# Patient Record
Sex: MALE | Race: WHITE | NOT HISPANIC OR LATINO | Employment: FULL TIME | URBAN - METROPOLITAN AREA
[De-identification: names, ages, dates, MRNs, and addresses within clinical notes are randomized per-mention and may not be internally consistent; named-entity substitution may affect disease eponyms.]

---

## 2023-10-24 ENCOUNTER — OFFICE VISIT (OUTPATIENT)
Dept: PRIMARY CARE | Facility: CLINIC | Age: 53
End: 2023-10-24
Payer: COMMERCIAL

## 2023-10-24 VITALS — WEIGHT: 227 LBS | SYSTOLIC BLOOD PRESSURE: 118 MMHG | DIASTOLIC BLOOD PRESSURE: 68 MMHG

## 2023-10-24 DIAGNOSIS — R63.5 WEIGHT GAIN: ICD-10-CM

## 2023-10-24 DIAGNOSIS — R10.31 INGUINAL PAIN, RIGHT: Primary | ICD-10-CM

## 2023-10-24 PROCEDURE — 99203 OFFICE O/P NEW LOW 30 MIN: CPT | Performed by: INTERNAL MEDICINE

## 2023-10-24 NOTE — PROGRESS NOTES
Subjective   Patient ID: Greg Rivera is a 52 y.o. male who presents for No chief complaint on file..    HPI the patient for first time septated front sheet no chest pain or shortness of breath but for about 6 months he has had some right inguinal area discomfort without having any bulge no normal defecation normal urination thinks it may have happened from overhead lifting he has gained approximately 20+ pounds since he stopped lifting because of this however he is able to play softball and do many other sports without any discomfort at all does have some discomfort when he moves his legs in tandem for instance to get out of bed to her car    Past medical history unremarkable    Medications none    Allergies no known drug allergies    Social history no tobacco alcohol rare    Prevention used to exercise more no recent blood work has had COVID vaccinations    Review of Systems    Objective   There were no vitals taken for this visit.    Physical Exam vital signs noted alert and oriented x3 NCAT no JVD or bruit chest clear to auscultation and percussion CV regular rate and rhythm S1-S2 without murmur gallop or rub abdomen soft nontender normal active bowel sounds LS spine normal curvature negative straight leg raise  normal descended testicles normal penis no inguinal adenopathy no inguinal hernia bilaterally extremities no clubbing cyanosis or edema normal distal pulses musculoskeletal normal hip range of motion no discomfort    Assessment/Plan impression inguinal pain right acute on chronic  Plan we will refer to sports medicine requisition made not apparent hernia we will continue to watch heavy bending and lifting Tylenol as needed warm packs as needed avoidance of certain exercises then will recheck for work at our wellness examination and blood work has had no blood work in the past has not had colonoscopy recheck sooner as needed TT 50 cc 26

## 2023-11-14 ENCOUNTER — ANCILLARY PROCEDURE (OUTPATIENT)
Dept: RADIOLOGY | Facility: CLINIC | Age: 53
End: 2023-11-14
Payer: COMMERCIAL

## 2023-11-14 ENCOUNTER — OFFICE VISIT (OUTPATIENT)
Dept: ORTHOPEDIC SURGERY | Facility: CLINIC | Age: 53
End: 2023-11-14
Payer: COMMERCIAL

## 2023-11-14 ENCOUNTER — APPOINTMENT (OUTPATIENT)
Dept: ORTHOPEDIC SURGERY | Facility: CLINIC | Age: 53
End: 2023-11-14
Payer: COMMERCIAL

## 2023-11-14 DIAGNOSIS — R10.31 GROIN PAIN, RIGHT: ICD-10-CM

## 2023-11-14 DIAGNOSIS — M76.891 HIP FLEXOR TENDONITIS, RIGHT: Primary | ICD-10-CM

## 2023-11-14 PROCEDURE — 99203 OFFICE O/P NEW LOW 30 MIN: CPT | Performed by: FAMILY MEDICINE

## 2023-11-14 PROCEDURE — 73502 X-RAY EXAM HIP UNI 2-3 VIEWS: CPT | Mod: RIGHT SIDE | Performed by: RADIOLOGY

## 2023-11-14 PROCEDURE — 73502 X-RAY EXAM HIP UNI 2-3 VIEWS: CPT | Mod: RT

## 2023-11-14 NOTE — LETTER
November 14, 2023     Maverick Barajas MD  [address]  [facility]  [facility] [address]    Patient: Marcus Piper   YOB: 1970   Date of Visit: 11/14/2023       Dear Dr. Maverick Barajas MD:    Thank you for referring Marcus Piper to me for evaluation. Below are my notes for this consultation.  If you have questions, please do not hesitate to call me. I look forward to following your patient along with you.       Sincerely,     Saji Vick, DO      CC: No Recipients  ______________________________________________________________________________________    ** Please excuse any errors in grammar or translation related to this dictation. Voice recognition software was utilized to prepare this document. **    Assessment & Plan:  Discussed with patient his current presentation is most consistent with hip flexor tendinitis.  For initial treatment recommending physical therapy to work on mobility of this musculature as well as the hip joint overall.  Did demonstrate some stretches he can perform daily.  Informed patient that he does also have some mild arthritic change in the setting of having a small cam deformity which is likely attributing to this.  However he does not have any current localizing symptoms to an intra-articular source of pain and this can be considered an incidental finding at this time.  If having persistent symptoms not responsive to the above measures recommend follow-up for reassessment after 6 weeks or sooner if acutely worsening.  Did briefly discuss option for tendon sheath injection in the future.  All quite answered and patient is Medicare.    Copy of today's encounter sent to referring PCP, Dr. Barajas.     Chief complaint:  Right groin pain    HPI:  51y/o M presents with right groin pain.  He was recently evaluated by his PCP Dr. Barajas and told he did not have hernia. He was then referred here for further evaluation and management.   This  complaint has been ongoing for since April.  No specific mechanism of injury but noticed pain started while doing overhead lifting during crossfit.  Pain is most prominent at the groin and lower abdomen but only sparingly occurring. Symptoms are aggravated by rolling over to get out of bed. Describes pain as pulling and tightness in groin. Did briefly attempt home PT but admits to stopping after 2-3 weeks. No medication needed to address the pain.     Exam:  RIGHT Hip Exam:  Normal gait  No warmth, erythema or ecchymosis overlying.  Active flexion >90 degrees with grossly normal extension,abduction, adduction, IR and ER.  NTTP over greater trochanter, glute tendons, proximal ITB, ischial tuberosity  5/5 strength of hip flexion, abduction, & adduction  SILT  -log roll pain,-FADIR pain,-VIANEY pain,-Stinchfield  -resisted external derotation test  + Ramon, + pain with resisted hip flexion with knee flexed      Results:  X-ray right hip reviewed and independent interpreted as no acute fracture.  Mild degenerative changes present.  Cam deformity present.      *Of note, patient's name was previously listed in the system incorrectly was Greg Wifeman. Records will need to be merged.*

## 2023-11-14 NOTE — PROGRESS NOTES
** Please excuse any errors in grammar or translation related to this dictation. Voice recognition software was utilized to prepare this document. **    Assessment & Plan:  Discussed with patient his current presentation is most consistent with hip flexor tendinitis.  For initial treatment recommending physical therapy to work on mobility of this musculature as well as the hip joint overall.  Did demonstrate some stretches he can perform daily.  Informed patient that he does also have some mild arthritic change in the setting of having a small cam deformity which is likely attributing to this.  However he does not have any current localizing symptoms to an intra-articular source of pain and this can be considered an incidental finding at this time.  If having persistent symptoms not responsive to the above measures recommend follow-up for reassessment after 6 weeks or sooner if acutely worsening.  Did briefly discuss option for tendon sheath injection in the future.  All quite answered and patient is Medicare.    Copy of today's encounter sent to referring PCP, Dr. Barajas.     Chief complaint:  Right groin pain    HPI:  53y/o M presents with right groin pain.  He was recently evaluated by his PCP Dr. Barajas and told he did not have hernia. He was then referred here for further evaluation and management.   This complaint has been ongoing for since April.  No specific mechanism of injury but noticed pain started while doing overhead lifting during crossfit.  Pain is most prominent at the groin and lower abdomen but only sparingly occurring. Symptoms are aggravated by rolling over to get out of bed. Describes pain as pulling and tightness in groin. Did briefly attempt home PT but admits to stopping after 2-3 weeks. No medication needed to address the pain.     Exam:  RIGHT Hip Exam:  Normal gait  No warmth, erythema or ecchymosis overlying.  Active flexion >90 degrees with grossly normal extension,abduction, adduction,  IR and ER.  NTTP over greater trochanter, glute tendons, proximal ITB, ischial tuberosity  5/5 strength of hip flexion, abduction, & adduction  SILT  -log roll pain,-FADIR pain,-VIANEY pain,-Stinchfield  -resisted external derotation test  + Ramon, + pain with resisted hip flexion with knee flexed      Results:  X-ray right hip reviewed and independent interpreted as no acute fracture.  Mild degenerative changes present.  Cam deformity present.      *Of note, patient's name was previously listed in the system incorrectly was Greg Wifeman. Records will need to be merged.*

## 2023-11-28 ENCOUNTER — EVALUATION (OUTPATIENT)
Dept: PHYSICAL THERAPY | Facility: CLINIC | Age: 53
End: 2023-11-28
Payer: COMMERCIAL

## 2023-11-28 DIAGNOSIS — M76.891 HIP FLEXOR TENDONITIS, RIGHT: ICD-10-CM

## 2023-11-28 DIAGNOSIS — M25.551 RIGHT HIP PAIN: Primary | ICD-10-CM

## 2023-11-28 PROCEDURE — 97110 THERAPEUTIC EXERCISES: CPT | Mod: GP

## 2023-11-28 PROCEDURE — 97161 PT EVAL LOW COMPLEX 20 MIN: CPT | Mod: GP

## 2023-11-28 PROCEDURE — 97535 SELF CARE MNGMENT TRAINING: CPT | Mod: GP

## 2023-12-06 ENCOUNTER — TREATMENT (OUTPATIENT)
Dept: PHYSICAL THERAPY | Facility: CLINIC | Age: 53
End: 2023-12-06
Payer: COMMERCIAL

## 2023-12-06 DIAGNOSIS — M25.551 RIGHT HIP PAIN: Primary | ICD-10-CM

## 2023-12-06 PROCEDURE — 97110 THERAPEUTIC EXERCISES: CPT | Mod: GP

## 2023-12-06 NOTE — PROGRESS NOTES
Physical Therapy  Physical Therapy Treatment    Patient Name: Marcus Piper  MRN: [id number]  Today's Date: 12/6/2023  Referred By: [employer]  Insurance: MMO; $0 co-pay  Visit Limit: 30  Visit: 2  Time Calculation  Start Time: 1212  Stop Time: 1250  Time Calculation (min): 38 min    Current Problem  1. Right hip pain  Follow Up In Physical Therapy           Pain: 0/10       Subjective:   Subjective   Patient reports that he has been doing okay. Has been performing HEP as prescribed.     Objective:   Objective   Gait: normal gait pattern    Treatments:     Exercise Sets/Reps   Upright bike 5'   Cybex hip flexion machine 65# 2 x 15    Cybex hip flexion 35# 2 x 15   18 inch step ups w/ 15# kb 2 x 15   Lateral banded walks w/ BTB 2 x 25 feet   Monster walks w/ BTB 2 x 25 feet   SL leg press 70# 3 x 10   SL bridges 2 x 8    Standing quad stretch 2 x 1'   Straight leg physioball bridges 2 x 15   Access Code: [email or web address]     Access Code: [email or web address]  URL: [email or web address]  Date: 12/06/2023  Prepared by: Inder Gutierrez    Exercises  - Modified Ramon Stretch  - 2 x daily - 7 x weekly - 2 sets - 60 seconds hold  - Hip Flexor Stretch with Chair  - 2 x daily - 7 x weekly - 60 seconds hold  - Supine Active Straight Leg Raise  - 2 x daily - 7 x weekly - 2 sets - 10 reps  - Supine Bridge  - 2 x daily - 7 x weekly - 2 sets - 10 reps  - Squat with Chair Touch  - 2 x daily - 7 x weekly - 2 sets - 10 reps  - Single Leg Bridge  - 2 x daily - 7 x weekly - 2 sets - 10 reps  - Side Stepping with Resistance at Thighs  - 2 x daily - 7 x weekly - 2 sets - 10 reps  - Backward Monster Walks  - 2 x daily - 7 x weekly - 2 sets - 10 reps  - Step Up  - 2 x daily - 7 x weekly - 2 sets - 10 reps  - Bridge with Arms at Sides and Feet on Swiss Ball  - 2 x daily - 7 x weekly - 2 sets - 10 reps  - Standing Quad Stretch with Table and Chair Support  - 2 x daily - 7 x weekly - 2 sets - 60 seconds hold    Assessment: Continued with  various hip strengthening and stretching exercise progressions without issues. Pt was able to tolerate treatment without complaints of pain. Pt was appropriately challenged with exercise selections. Exercises added to HEP. Will continue to progress as able.       Plan: Discharge by end of the month.        Inder Gutierrez, PT

## 2023-12-13 ENCOUNTER — APPOINTMENT (OUTPATIENT)
Dept: PHYSICAL THERAPY | Facility: CLINIC | Age: 53
End: 2023-12-13
Payer: COMMERCIAL

## 2023-12-14 ENCOUNTER — APPOINTMENT (OUTPATIENT)
Dept: PHYSICAL THERAPY | Facility: CLINIC | Age: 53
End: 2023-12-14
Payer: COMMERCIAL

## 2023-12-20 ENCOUNTER — TREATMENT (OUTPATIENT)
Dept: PHYSICAL THERAPY | Facility: CLINIC | Age: 53
End: 2023-12-20
Payer: COMMERCIAL

## 2023-12-20 DIAGNOSIS — M25.551 RIGHT HIP PAIN: ICD-10-CM

## 2023-12-20 PROCEDURE — 97110 THERAPEUTIC EXERCISES: CPT | Mod: GP

## 2023-12-20 NOTE — PROGRESS NOTES
Physical Therapy  Physical Therapy Treatment    Patient Name: Marcus Piper  MRN: [id number]  Today's Date: 12/20/2023  Referred By: [employer]  Insurance: MMO; $0 co-pay  Visit Limit: 30  Visit: 3    Time Calculation  Start Time: 1048  Stop Time: 1100  Time Calculation (min): 12 min    Current Problem  1. Right hip pain  Follow Up In Physical Therapy           Pain: 0/10           Goals:     PT Problem       PT Goal 1       Start:  11/28/23    Expected End:  01/12/24       In 3 weeks, pt will rate pain 0-1 on the numeric pain scale. (100% met)  In 3 weeks, pt will be able to perform box jumps, crossfit activities without pain. (100% met)  In 3 weeks, pt will be able to jog without symptoms. (100% met)         PT Goal 2       Start:  11/28/23    Expected End:  02/26/24       In 6 weeks, pt's LEFS will be 80. (100% met)  In 6 weeks, pt will be independent with HEP. (100% met)  In 6 weeks, pt will be able to return to recreational activities without restrictions. (100% met)             Subjective:   Subjective   Patient reports that he has been doing well. Notes that he had covid so has not been keeping up with his HEP. However, pt says that he plans to start at local gym soon.    Objective:   Objective   Gait: normal gait pattern    4 Stage Balance Test  Close stance: 30 sec   Semi-Tandem: 30 sec B  Tandem  R: 30 sec  L: 30 sec  Single Leg  R: 30 sec   L: 30 sec     Hip AROM  Flexion  R: 100 deg   L: 105 deg   Abduction  R: 50 deg   L: 50 deg   IR  R: 10 deg   L: 15 deg   ER  R: 30 deg   L: 30 deg     LE MMT   Hip Flexion  R: 5  L: 5  Hip Abduction  R: 5  L: 5  Knee Flexion  R: 5  L: 5  Knee Extension  R: 5  L: 5    Flexibility  Hip Flexors: min tight B    Lower Extremity Functional Movements  Transfers: independent    Outcome Measures:  LEFS: 80 from 79    Treatments:   Exercise Sets/Reps   Upright bike 5'   Objective measures   Education on rehab goals, prognosis, plan of care      Assessment: Upon re-examination, pt  displayed improvements in gross lower extremity strength, balance, pain and overall functional ability. Pt has met 6/6 goals and is to be discharged home with HEP. Pt verbalizes understanding and agreement in discharge plan. Encouraged pt to contact office if he has future questions, concerns.     Plan: Discharge home with HEP.       Inder Gutierrez, PT

## 2024-02-26 ENCOUNTER — OFFICE VISIT (OUTPATIENT)
Dept: PRIMARY CARE | Facility: CLINIC | Age: 54
End: 2024-02-26
Payer: COMMERCIAL

## 2024-02-26 VITALS — SYSTOLIC BLOOD PRESSURE: 121 MMHG | DIASTOLIC BLOOD PRESSURE: 71 MMHG

## 2024-02-26 DIAGNOSIS — M54.2 CERVICALGIA OF OCCIPITO-ATLANTO-AXIAL REGION: ICD-10-CM

## 2024-02-26 DIAGNOSIS — G44.209 TENSION HEADACHE: Primary | ICD-10-CM

## 2024-02-26 PROCEDURE — 99213 OFFICE O/P EST LOW 20 MIN: CPT | Performed by: INTERNAL MEDICINE

## 2024-02-26 NOTE — PROGRESS NOTES
Subjective   Patient ID: Marcus Piper is a 53 y.o. male who presents for No chief complaint on file..    HPI   follow-up visit and sick visit has been having headaches for the past 2 weeks has not had the importance patient is he has a new computer and had new glasses also has been glasses for the first time no radiation down the arms not much in the way of sinus had tried some Advil without much success no chest pain or shortness of breath no fever    Review of Systems    Objective   There were no vitals taken for this visit.  vital signs noted alert and oriented x 3 NCAT cranial nerves II through XII intact no   Physical Exam jaw click no temporal artery tenderness PERRLA EOMI nares without discharge OP benign TM normal bilateral EAC clear bilateral no AC nodes no JVD cervical spine full range of motion without much clinically normal shoulder shrug normal upper extremity strength DTR 2+ chest clear to auscultation CV regular rate and rhythm S1-S2 extremities no clubbing cyanosis or edema normal distal pulses    Assessment/Plan    impression cervical neck and headache other diagnoses  Plan neck body use heating pad therefore up to 1 hour at a time especially after coming home from work may ergonomically adjust his workstation the change from Advil to Excedrin Migraine for 1 to 2 days to see if that will be helpful otherwise we will recheck for his regular physical examination with blood work and April 2024 or sooner as needed stay well-hydrated and follow-up

## 2024-03-29 ENCOUNTER — OFFICE VISIT (OUTPATIENT)
Dept: PRIMARY CARE | Facility: CLINIC | Age: 54
End: 2024-03-29
Payer: COMMERCIAL

## 2024-03-29 ENCOUNTER — LAB (OUTPATIENT)
Dept: LAB | Facility: LAB | Age: 54
End: 2024-03-29
Payer: COMMERCIAL

## 2024-03-29 VITALS
SYSTOLIC BLOOD PRESSURE: 123 MMHG | HEIGHT: 73 IN | BODY MASS INDEX: 31.14 KG/M2 | DIASTOLIC BLOOD PRESSURE: 71 MMHG | WEIGHT: 235 LBS

## 2024-03-29 DIAGNOSIS — Z00.00 HEALTH CARE MAINTENANCE: Primary | ICD-10-CM

## 2024-03-29 DIAGNOSIS — Z00.00 HEALTH CARE MAINTENANCE: ICD-10-CM

## 2024-03-29 LAB
ANION GAP SERPL CALC-SCNC: 12 MMOL/L (ref 10–20)
BUN SERPL-MCNC: 17 MG/DL (ref 6–23)
CALCIUM SERPL-MCNC: 10.5 MG/DL (ref 8.6–10.6)
CHLORIDE SERPL-SCNC: 103 MMOL/L (ref 98–107)
CHOLEST SERPL-MCNC: 191 MG/DL (ref 0–199)
CHOLESTEROL/HDL RATIO: 4.7
CO2 SERPL-SCNC: 28 MMOL/L (ref 21–32)
CREAT SERPL-MCNC: 1.03 MG/DL (ref 0.5–1.3)
EGFRCR SERPLBLD CKD-EPI 2021: 86 ML/MIN/1.73M*2
ERYTHROCYTE [DISTWIDTH] IN BLOOD BY AUTOMATED COUNT: 13.2 % (ref 11.5–14.5)
GLUCOSE SERPL-MCNC: 103 MG/DL (ref 74–99)
HCT VFR BLD AUTO: 47.1 % (ref 41–52)
HDLC SERPL-MCNC: 40.4 MG/DL
HGB BLD-MCNC: 16 G/DL (ref 13.5–17.5)
LDLC SERPL CALC-MCNC: 130 MG/DL
MCH RBC QN AUTO: 28.7 PG (ref 26–34)
MCHC RBC AUTO-ENTMCNC: 34 G/DL (ref 32–36)
MCV RBC AUTO: 84 FL (ref 80–100)
NON HDL CHOLESTEROL: 151 MG/DL (ref 0–149)
NRBC BLD-RTO: 0 /100 WBCS (ref 0–0)
PLATELET # BLD AUTO: 248 X10*3/UL (ref 150–450)
POTASSIUM SERPL-SCNC: 4.4 MMOL/L (ref 3.5–5.3)
PSA SERPL-MCNC: 0.9 NG/ML
RBC # BLD AUTO: 5.58 X10*6/UL (ref 4.5–5.9)
SODIUM SERPL-SCNC: 139 MMOL/L (ref 136–145)
TRIGL SERPL-MCNC: 104 MG/DL (ref 0–149)
VLDL: 21 MG/DL (ref 0–40)
WBC # BLD AUTO: 5.6 X10*3/UL (ref 4.4–11.3)

## 2024-03-29 PROCEDURE — 80048 BASIC METABOLIC PNL TOTAL CA: CPT

## 2024-03-29 PROCEDURE — 36415 COLL VENOUS BLD VENIPUNCTURE: CPT

## 2024-03-29 PROCEDURE — 84153 ASSAY OF PSA TOTAL: CPT

## 2024-03-29 PROCEDURE — 85027 COMPLETE CBC AUTOMATED: CPT

## 2024-03-29 PROCEDURE — 80061 LIPID PANEL: CPT

## 2024-03-29 PROCEDURE — 99396 PREV VISIT EST AGE 40-64: CPT | Performed by: INTERNAL MEDICINE

## 2024-03-29 NOTE — PROGRESS NOTES
Subjective   Patient ID: Marcus Piper is a 53 y.o. male who presents for No chief complaint on file..    HPI CPE see updated front sheet has been working on the right hip headaches have mostly cleared no chest pain no shortness of breath no side effect with medication when taking bowels normal no dysuria    Past medical history unremarkable    Medications noted and unchanged    Allergies no known drug allergies    Social history no tobacco alcohol rare    Family history noted and unchanged    Prevention some exercise has not had blood work in the past uncertain of colonoscopy    Review of Systems    Objective   There were no vitals taken for this visit.    Physical Exam vital signs noted waist circumference 43 inches alert and oriented x 3 NCAT PERRLA EOMI nares without discharge OP benign no AC nodes no JVD no thyromegaly chest clear to auscultation CV regular rate and rhythm S1-S2 abdomen soft nontender normal active bowel sounds LS spine curvature negative straight leg raise extremities no clubbing cyanosis or edema normal distal pulses DTR 2+    Assessment/Plan impression General medical examination other diagnoses   plan continue with exercises for the right hip and right lower extremity Tylenol as needed good diet regular exercise increase water consumption check Chem-7 advised on glucose potassium and kidney function check CBC advised on blood count check lipid panel advised on cholesterol cholesterol medicine if needed consideration for colonoscopy check PSA advised on prostate has not had previously follow-up medical paperwork recheck based on labs TT 50 cc 26        No

## 2024-04-10 ENCOUNTER — APPOINTMENT (OUTPATIENT)
Dept: PRIMARY CARE | Facility: CLINIC | Age: 54
End: 2024-04-10
Payer: COMMERCIAL

## 2025-04-08 ENCOUNTER — APPOINTMENT (OUTPATIENT)
Dept: PRIMARY CARE | Facility: CLINIC | Age: 55
End: 2025-04-08
Payer: COMMERCIAL

## 2025-04-08 VITALS
SYSTOLIC BLOOD PRESSURE: 121 MMHG | HEART RATE: 72 BPM | WEIGHT: 234 LBS | BODY MASS INDEX: 30.66 KG/M2 | RESPIRATION RATE: 12 BRPM | DIASTOLIC BLOOD PRESSURE: 71 MMHG

## 2025-04-08 DIAGNOSIS — Z12.11 ENCOUNTER FOR SCREENING FOR MALIGNANT NEOPLASM OF COLON: ICD-10-CM

## 2025-04-08 DIAGNOSIS — E78.2 MIXED HYPERLIPIDEMIA: ICD-10-CM

## 2025-04-08 DIAGNOSIS — Z00.00 HEALTH CARE MAINTENANCE: Primary | ICD-10-CM

## 2025-04-08 PROCEDURE — 99396 PREV VISIT EST AGE 40-64: CPT | Performed by: INTERNAL MEDICINE

## 2025-04-08 NOTE — PROGRESS NOTES
Subjective   Patient ID: Marcus Piper is a 54 y.o. male who presents for No chief complaint on file..    HPI CPE see updated front sheet No chest pain no shortness of breath no side effect with medication wh bowels normal no dysuria bones muscles joints okay sometimesen taking right hip aches has been exercising regularly though    Past medical history unremarkable mild hyperlipidemia    Medications noted and unchanged no regular medication    Allergies no known drug allergies    Social history no tobacco alcohol rare    Family history noted and unchanged except for her older brother recently diagnosed with colon cancer    Prevention some exercise has not had blood work in the past has not had colonoscopy    Review of Systems    Objective   /71   Pulse 72   Resp 12   Wt 106 kg (234 lb)   BMI 30.66 kg/m²     Physical Exam vital signs noted alert and oriented x 3 NCAT PERRLA EOMI nares without discharge OP benign no AC nodes no JVD or bruit no thyromegaly chest clear to auscultation and percussion CV regular rate and rhythm S1-S2 without murmur gallop or rub abdomen soft nontender normal active bowel sounds no rebound or guarding LS spine curvature negative straight leg raise extremities no clubbing cyanosis or edema normal distal pulses DTR 2+    Assessment/Plan impression General medical examination other diagnoses family history colon cancer hyperlipidemia  Plan continue to work on the hip and is regular exercise check Chem-7 advised on glucose potassium and kidney function check CBC advised on blood count okay for Cologuard requisition made he is not apt to do colonoscopy although that is the recommended test check lipid panel advised on cholesterol profile check PSA advised on prostate good overall diet regular exercise good water consumption fill out medical paperwork Tylenol as needed for the joints recheck 6 months based on labs TT 60 cc 31

## 2025-04-09 LAB
ANION GAP SERPL CALCULATED.4IONS-SCNC: 9 MMOL/L (CALC) (ref 7–17)
BUN SERPL-MCNC: 17 MG/DL (ref 7–25)
BUN/CREAT SERPL: ABNORMAL (CALC) (ref 6–22)
CALCIUM SERPL-MCNC: 9.9 MG/DL (ref 8.6–10.3)
CHLORIDE SERPL-SCNC: 101 MMOL/L (ref 98–110)
CHOLEST SERPL-MCNC: 177 MG/DL
CHOLEST/HDLC SERPL: 4.2 (CALC)
CO2 SERPL-SCNC: 28 MMOL/L (ref 20–32)
CREAT SERPL-MCNC: 0.91 MG/DL (ref 0.7–1.3)
EGFRCR SERPLBLD CKD-EPI 2021: 100 ML/MIN/1.73M2
ERYTHROCYTE [DISTWIDTH] IN BLOOD BY AUTOMATED COUNT: 12.9 % (ref 11–15)
GLUCOSE SERPL-MCNC: 104 MG/DL (ref 65–99)
HCT VFR BLD AUTO: 49.4 % (ref 38.5–50)
HDLC SERPL-MCNC: 42 MG/DL
HGB BLD-MCNC: 16.4 G/DL (ref 13.2–17.1)
LDLC SERPL CALC-MCNC: 115 MG/DL (CALC)
MCH RBC QN AUTO: 28.5 PG (ref 27–33)
MCHC RBC AUTO-ENTMCNC: 33.2 G/DL (ref 32–36)
MCV RBC AUTO: 85.8 FL (ref 80–100)
NONHDLC SERPL-MCNC: 135 MG/DL (CALC)
PLATELET # BLD AUTO: 259 THOUSAND/UL (ref 140–400)
PMV BLD REES-ECKER: 10.7 FL (ref 7.5–12.5)
POTASSIUM SERPL-SCNC: 4.2 MMOL/L (ref 3.5–5.3)
PSA SERPL-MCNC: 0.95 NG/ML
RBC # BLD AUTO: 5.76 MILLION/UL (ref 4.2–5.8)
SODIUM SERPL-SCNC: 138 MMOL/L (ref 135–146)
TRIGL SERPL-MCNC: 102 MG/DL
WBC # BLD AUTO: 5.9 THOUSAND/UL (ref 3.8–10.8)

## 2025-05-09 LAB — NONINV COLON CA DNA+OCC BLD SCRN STL QL: NEGATIVE
